# Patient Record
Sex: FEMALE | Race: WHITE | HISPANIC OR LATINO | URBAN - METROPOLITAN AREA
[De-identification: names, ages, dates, MRNs, and addresses within clinical notes are randomized per-mention and may not be internally consistent; named-entity substitution may affect disease eponyms.]

---

## 2024-10-04 ENCOUNTER — APPOINTMENT (RX ONLY)
Dept: URBAN - METROPOLITAN AREA CLINIC 77 | Facility: CLINIC | Age: 69
Setting detail: DERMATOLOGY
End: 2024-10-04

## 2024-10-04 PROBLEM — C43.4 MALIGNANT MELANOMA OF SCALP AND NECK: Status: ACTIVE | Noted: 2024-10-04

## 2024-10-04 PROCEDURE — 99213 OFFICE O/P EST LOW 20 MIN: CPT

## 2024-10-04 PROCEDURE — ? ADDITIONAL NOTES

## 2024-10-04 PROCEDURE — ? CASTLE DECISIONDX - MELANOMA

## 2024-10-04 PROCEDURE — ? COUNSELING

## 2024-10-04 NOTE — PROCEDURE: ADDITIONAL NOTES
Additional Notes:  will contact patient to schedule excision with Dr Portillo or soonest available surgeon, refer to oncology and also lora science .
Detail Level: Simple
Render Risk Assessment In Note?: no

## 2024-10-04 NOTE — PROCEDURE: CASTLE [facility] - MELANOMA
Attempted to call pharmacy, they are currently closed. Will attempt after 9am.      Documentation copied into the chart for continuity.  Bry Pickett    8/11/23 11:38 AM  Note     [facility] Approved     Prior Authorization Number: [id number]  Dates of approval: 8/11/23-8/10/24      
From: Deepa James  To: Myah Goldberg  Sent: 6/21/2023 5:04 AM CDT  Subject: Follow Up Notes    Not looking for a reply, just noting results.   Chugged magnesium citrate around 12:00-12:30pm. Meals eaten about 15-20 mins after the magnesium, then again at around 6:00pm. No results until about 8:00pm which were accompanied by a migraine (not sure why) with no abdominal cramping. Took Linzess at 9:00pm. Purge seemingly complete by 10:30pm when it turned to gas only. Abdominal gurgling continues.    CT scan scheduled for Tuesday.  
Per pharmacy, pt did not notify them to have med filled. Medication is out of stock. Informed pt of medication approval/out of stock. Asked pt to contact pharmacy if any further questions.   
Plan Information: Please ensure the pathology report and [facility] Requistion are attached before sending in the order.
Detail Level: Detailed
Test Type: Gene Expression Profile Test
Has A [place] Lymph Node Biopsy Been Performed For This Melanoma?: No
Does This Patient Have Medicare As Primary Insurance?: Yes
At The Time Of Tissue Collection Where Was The Patient: Non-Hospital
Rational For Genetic Testing: The [facility]-Melanoma test can add additional information about the risk of distant metastasis for Stage I and II melanoma patients.
Lab: [id number]
Lab Facility: 0
Notification Instructions: Patient will be notified of their results. However, patient instructed to call the office if not contacted within 2 weeks.
Billing Type: Third-Party Bill

## 2024-10-25 ENCOUNTER — APPOINTMENT (RX ONLY)
Dept: URBAN - METROPOLITAN AREA CLINIC 77 | Facility: CLINIC | Age: 69
Setting detail: DERMATOLOGY
End: 2024-10-25

## 2024-10-25 VITALS — DIASTOLIC BLOOD PRESSURE: 73 MMHG | SYSTOLIC BLOOD PRESSURE: 162 MMHG | HEART RATE: 88 BPM

## 2024-10-25 PROBLEM — C43.4 MALIGNANT MELANOMA OF SCALP AND NECK: Status: ACTIVE | Noted: 2024-10-25

## 2024-10-25 PROCEDURE — 13132 CMPLX RPR F/C/C/M/N/AX/G/H/F: CPT

## 2024-10-25 PROCEDURE — ? PRESCRIPTION

## 2024-10-25 PROCEDURE — ? EXCISION, CUTANEOUS (MALIGNANT)

## 2024-10-25 PROCEDURE — ? COUNSELING - MELANOMA

## 2024-10-25 PROCEDURE — ? COUNSELING EXCISION - MELANOMA

## 2024-10-25 PROCEDURE — 11624 EXC S/N/H/F/G MAL+MRG 3.1-4: CPT

## 2024-10-25 RX ORDER — MUPIROCIN 20 MG/G
1 OINTMENT TOPICAL TWICE A DAY
Qty: 22 | Refills: 0 | Status: ERX | COMMUNITY
Start: 2024-10-25

## 2024-10-25 RX ORDER — CEPHALEXIN 500 MG/1
1 CAPSULE ORAL BID
Qty: 14 | Refills: 0 | Status: ERX | COMMUNITY
Start: 2024-10-25

## 2024-10-25 RX ADMIN — CEPHALEXIN 1: 500 CAPSULE ORAL at 00:00

## 2024-10-25 RX ADMIN — MUPIROCIN 1: 20 OINTMENT TOPICAL at 00:00

## 2024-10-25 NOTE — PROCEDURE: EXCISION, CUTANEOUS (MALIGNANT)
Dermal Sutures: 4-0 Monocryl
Detail Level: Detailed
Surgeon: Dr. Portillo
Consent: Informed consent was obtained from the patient. The risks, benefits, expectations and alternatives to therapy were discussed in detail. Specifically, the risks of infection, scarring, bleeding, prolonged wound healing, incomplete removal, allergy to anesthesia, nerve injury and recurrence were addressed. Prior to the procedure, the treatment site was clearly identified and confirmed by the patient. All components of Universal Protocol/PAUSE Rule completed.
Epidermal Sutures: 4-0 Prolene
Epidermal Sutures - Donor Site: 4-0 Ethilon
Size Of Lesion In Cm: 2
Hemostasis: electrocautery
Total Undermining Distance Perpendicular To Closure Line (In Cm), Complex Closures: 1.5
Epidermal Closure - Donor Site: simple interrupted
Lab: 11
Lab Facility: 0
Deep Sutures: 3-0 Monocryl
Previous Accession (Optional): [id number]
Suture Removal: 14 days
Path Notes (To The Pathologist): Please check margins. Stitch tim 12 o'clock black long, 6 o'clock black short, 9 o'clock blue long, 3 o'clock blue short. Process en face and SOX10.
Size Of Margin In Cm: 1
Initial Wound Width (In Cm), Intermediate/Complex Closures: 1.3
Excision Method: Elliptical
Intermediate / Complex Repair - Final Wound Length In Cm: 7
Bill For Surgical Tray: no
Anesthesia Type: 1% lidocaine with epinephrine
Estimated Blood Loss (Cc): minimal
Layered Repair: Complex
Notification Instructions: Patient will be notified of results. However, the patient was instructed to call the office if not contacted within 2 weeks.
Scalpel Size: 15 blade

## 2024-10-25 NOTE — PROCEDURE: MIPS QUALITY
Quality 138: Melanoma: Coordination Of Care: A treatment plan was communicated to the physicians providing continuing care within one month of diagnosis outlining: diagnosis, tumor thickness and a plan for surgery or alternate care.
Quality 226: Preventive Care And Screening: Tobacco Use: Screening And Cessation Intervention: Patient screened for tobacco use and is an ex/non-smoker
Detail Level: Detailed
Quality 431: Preventive Care And Screening: Unhealthy Alcohol Use - Screening: Patient not identified as an unhealthy alcohol user when screened for unhealthy alcohol use using a systematic screening method
Quality 137: Melanoma: Continuity Of Care - Recall System: Patient information entered into a recall system that includes: target date for the next exam specified AND a process to follow up with patients regarding missed or unscheduled appointments
Quality 130: Documentation Of Current Medications In The Medical Record: Current Medications Documented